# Patient Record
Sex: MALE | Race: WHITE | Employment: UNEMPLOYED | URBAN - METROPOLITAN AREA
[De-identification: names, ages, dates, MRNs, and addresses within clinical notes are randomized per-mention and may not be internally consistent; named-entity substitution may affect disease eponyms.]

---

## 2024-06-06 ENCOUNTER — OFFICE VISIT (OUTPATIENT)
Dept: NEUROSURGERY | Age: 44
End: 2024-06-06

## 2024-06-06 VITALS
HEART RATE: 99 BPM | SYSTOLIC BLOOD PRESSURE: 130 MMHG | BODY MASS INDEX: 28.1 KG/M2 | WEIGHT: 158.6 LBS | HEIGHT: 63 IN | DIASTOLIC BLOOD PRESSURE: 88 MMHG

## 2024-06-06 DIAGNOSIS — M47.812 CERVICAL SPONDYLOSIS: ICD-10-CM

## 2024-06-06 DIAGNOSIS — M47.26 OTHER SPONDYLOSIS WITH RADICULOPATHY, LUMBAR REGION: ICD-10-CM

## 2024-06-06 DIAGNOSIS — Z98.1 HISTORY OF FUSION OF SPINE FOR SCOLIOSIS: ICD-10-CM

## 2024-06-06 DIAGNOSIS — Z13.828 SCOLIOSIS CONCERN: Primary | ICD-10-CM

## 2024-06-06 DIAGNOSIS — Z87.39 HISTORY OF FUSION OF SPINE FOR SCOLIOSIS: ICD-10-CM

## 2024-06-06 RX ORDER — GABAPENTIN 400 MG/1
400 CAPSULE ORAL
COMMUNITY
Start: 2024-06-03

## 2024-06-06 RX ORDER — MELOXICAM 15 MG/1
15 TABLET ORAL DAILY
COMMUNITY

## 2024-06-06 RX ORDER — HYDROCODONE BITARTRATE AND ACETAMINOPHEN 5; 325 MG/1; MG/1
1 TABLET ORAL 2 TIMES DAILY PRN
COMMUNITY
Start: 2024-05-30

## 2024-06-06 RX ORDER — LEVETIRACETAM 500 MG/1
500 TABLET ORAL 2 TIMES DAILY
COMMUNITY
Start: 2022-03-14

## 2024-06-06 RX ORDER — ORPHENADRINE CITRATE 100 MG/1
100 TABLET, EXTENDED RELEASE ORAL 2 TIMES DAILY
COMMUNITY
Start: 2024-05-08

## 2024-06-06 NOTE — PROGRESS NOTES
[facility] NEUROSURGERY [facility]  [address]  [address]  [address]  [facility] [id number]  Dept: [phone]    Patient:  Terry Pantoja  YOB: 1980  Date: 6/6/24    The patient is a 44 y.o. male who presents today for consult of the following problems:     Chief Complaint   Patient presents with    New Patient     M41.9 (ICD-10-CM) - Scoliosis of thoracolumbar spine  Pt is stated middle back and lower back pain         HPI:     Terry Pantoja is a 44 y.o. male on whom neurosurgical consultation was requested by Jamin Tijerina MD for management of neck and mid back pain.    Patient presents with neck and mid back pain. Patient has a history of scoliosis with 2 surgeries to help correct this in 1991. About 1 year ago, he started to have right mid back pain at the scapula level and neck pain. He has been going to a chiropractor for the past 5 years. No known injury. The pain is constant and described as aching. Aggravated by sitting, standing, bending, twisting, and lifting. Alleviated with rest, heat, medications. Medications include Gabapentin, Norco, Moloxicam, and Norflex. The pain in his posterior neck will radiate to the shoulders, right side worse than left and does not radiate down the arms. Patient also has pain in the right buttocks, through the right hip and will radiate diffusely down the right leg. The mid back pain is axial and does not radiate. Severity 6-8/10. Patient reports weakness related to pain in the right hip and leg. No numbness or tingling.     No bladder or bowel incontinence. No saddle anesthesia.    History of seizures last one was march 2022.  History of scoliosis with 2 spine surgeries in 1991  History of right femur fracture in high school, had a sandra placed.    Patient has been to pain management and physical therapy through [facility] without relief.     History:     Past

## 2024-06-27 ENCOUNTER — HOSPITAL ENCOUNTER (OUTPATIENT)
Dept: MRI IMAGING | Age: 44
Discharge: HOME | End: 2024-06-29
Payer: COMMERCIAL

## 2024-06-27 ENCOUNTER — HOSPITAL ENCOUNTER (OUTPATIENT)
Dept: CT IMAGING | Age: 44
Discharge: HOME | End: 2024-06-29
Payer: COMMERCIAL

## 2024-06-27 ENCOUNTER — HOSPITAL ENCOUNTER (OUTPATIENT)
Dept: GENERAL RADIOLOGY | Age: 44
Discharge: HOME | End: 2024-06-29
Payer: COMMERCIAL

## 2024-06-27 DIAGNOSIS — Z98.1 HISTORY OF FUSION OF SPINE FOR SCOLIOSIS: ICD-10-CM

## 2024-06-27 DIAGNOSIS — M47.26 OTHER SPONDYLOSIS WITH RADICULOPATHY, LUMBAR REGION: ICD-10-CM

## 2024-06-27 DIAGNOSIS — Z87.39 HISTORY OF FUSION OF SPINE FOR SCOLIOSIS: ICD-10-CM

## 2024-06-27 DIAGNOSIS — Z13.828 SCOLIOSIS CONCERN: ICD-10-CM

## 2024-06-27 DIAGNOSIS — M47.812 CERVICAL SPONDYLOSIS: ICD-10-CM

## 2024-06-27 PROCEDURE — 72131 CT LUMBAR SPINE W/O DYE: CPT

## 2024-06-27 PROCEDURE — 72050 X-RAY EXAM NECK SPINE 4/5VWS: CPT

## 2024-06-27 PROCEDURE — 72148 MRI LUMBAR SPINE W/O DYE: CPT

## 2024-06-27 PROCEDURE — 72110 X-RAY EXAM L-2 SPINE 4/>VWS: CPT

## 2024-06-27 PROCEDURE — 72128 CT CHEST SPINE W/O DYE: CPT

## 2024-06-27 PROCEDURE — 72082 X-RAY EXAM ENTIRE SPI 2/3 VW: CPT

## 2024-07-23 ENCOUNTER — OFFICE VISIT (OUTPATIENT)
Dept: NEUROSURGERY | Age: 44
End: 2024-07-23
Payer: COMMERCIAL

## 2024-07-23 VITALS
WEIGHT: 158.4 LBS | HEART RATE: 105 BPM | SYSTOLIC BLOOD PRESSURE: 121 MMHG | BODY MASS INDEX: 28.07 KG/M2 | DIASTOLIC BLOOD PRESSURE: 86 MMHG | HEIGHT: 63 IN

## 2024-07-23 DIAGNOSIS — Z13.828 SCOLIOSIS CONCERN: Primary | ICD-10-CM

## 2024-07-23 DIAGNOSIS — G89.29 CHRONIC BILATERAL LOW BACK PAIN, UNSPECIFIED WHETHER SCIATICA PRESENT: ICD-10-CM

## 2024-07-23 DIAGNOSIS — M54.50 CHRONIC BILATERAL LOW BACK PAIN, UNSPECIFIED WHETHER SCIATICA PRESENT: ICD-10-CM

## 2024-07-23 PROCEDURE — 99214 OFFICE O/P EST MOD 30 MIN: CPT | Performed by: NEUROLOGICAL SURGERY

## 2024-07-23 PROCEDURE — G8427 DOCREV CUR MEDS BY ELIG CLIN: HCPCS | Performed by: NEUROLOGICAL SURGERY

## 2024-07-23 PROCEDURE — 1036F TOBACCO NON-USER: CPT | Performed by: NEUROLOGICAL SURGERY

## 2024-07-23 PROCEDURE — G8419 CALC BMI OUT NRM PARAM NOF/U: HCPCS | Performed by: NEUROLOGICAL SURGERY

## 2024-07-23 RX ORDER — NEOMYCIN SULFATE, POLYMYXIN B SULFATE, BACITRACIN ZINC, HYDROCORTISONE 3.5; 10000; 400; 1 MG/G; [USP'U]/G; [USP'U]/G; MG/G
1 OINTMENT OPHTHALMIC 4 TIMES DAILY
COMMUNITY
Start: 2024-07-23

## 2024-07-23 NOTE — PROGRESS NOTES
Department of Neurosurgery                                                      Follow up visit    History Obtained From:  patient    CHIEF COMPLAINT:         Chief Complaint   Patient presents with    Other     Patient here to review all images.       HISTORY OF PRESENT ILLNESS:       The patient is a 44 y.o. male who presents for follow up for scoliosis concern, cervical spondylosis, lumbar spondylosis with radiculopathy, and history of fusion of spine for scoliosis. This is my first time seeing this patient in clinic.    Patient indicates that he has been experiencing lower back pain for 7-8 years, but that he has the new onset of upper back pain as of this past year. He admits to shortness of breath associated with the pain. He indicates that he previously underwent back surgery with Dr. Kee at [facility] in 1991 when he was 11 years old.    The upper and lower back pain interchange in severity. In general, sitting in the car for extended periods of time is painful for the patient, and he has to put a pillow between his back and the car seat. He also advises that he can only walk or stand for 10 minutes at a time with no pain. He states that the pain is worst in the lower back when walking.    He has completed 2 courses of physical therapy over the past three years. These courses have provided him some relief, but nothing permanent.    He denies having followed with any orthopedic surgeons after his initial surgery with Dr. Kee.    PAST MEDICAL HISTORY :       Past Medical History:        Diagnosis Date    Acquired scoliosis     Lazy eye, bilateral        Past Surgical History:        Procedure Laterality Date    BACK SURGERY      Two back surgeries 1991    EYE SURGERY Bilateral     Two eye surgeries around 1984 or 1985    EYE SURGERY  07/19/2024    KIDNEY STONE SURGERY  2012    LEG SURGERY  1997    Clem in right hip    TESTICLE SURGERY  1990       Social History:   Social History